# Patient Record
Sex: MALE | Race: BLACK OR AFRICAN AMERICAN
[De-identification: names, ages, dates, MRNs, and addresses within clinical notes are randomized per-mention and may not be internally consistent; named-entity substitution may affect disease eponyms.]

---

## 2017-04-02 ENCOUNTER — HOSPITAL ENCOUNTER (EMERGENCY)
Dept: HOSPITAL 61 - ER | Age: 52
Discharge: HOME | End: 2017-04-02
Payer: COMMERCIAL

## 2017-04-02 VITALS — WEIGHT: 250 LBS | HEIGHT: 74 IN | BODY MASS INDEX: 32.08 KG/M2

## 2017-04-02 VITALS — DIASTOLIC BLOOD PRESSURE: 77 MMHG | SYSTOLIC BLOOD PRESSURE: 131 MMHG

## 2017-04-02 DIAGNOSIS — M75.42: Primary | ICD-10-CM

## 2017-04-02 DIAGNOSIS — I10: ICD-10-CM

## 2017-04-02 NOTE — PHYS DOC
Past Medical History


Past Medical History:  Hypertension


Past Surgical History:  No Surgical History


Alcohol Use:  Heavy


Drug Use:  None





Adult General


Chief Complaint


Chief Complaint:  UPPER EXTREMITY PAIN





HPI


HPI


51-year-old male who's having left shoulder discomfort and states this has been 

ongoing for the last 30 days. He denies any inciting injury but does state he 

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 He states he has decreased 

range of motion in the left shoulder but denies any traumatic injury. He also 

states he was sleeping irregularly on it tonight and developed some numbness 

and tingling down his left arm with difficulty to extend his wrist.





Review of Systems


Review of Systems





Constitutional: Denies fever or chills []


Eyes: Denies change in visual acuity, redness, or eye pain []


HENT: Denies nasal congestion or sore throat []


Respiratory: Denies cough or shortness of breath []


Cardiovascular: No additional information not addressed in HPI []


GI: Denies abdominal pain, nausea, vomiting, bloody stools or diarrhea []


: Denies dysuria or hematuria []


Musculoskeletal: Denies back pain, has joint pain []


Integument: Denies rash or skin lesions []


Neurologic: Denies headache, focal weakness or sensory changes []


Endocrine: Denies polyuria or polydipsia []





Current Medications


Current Medications





 Current Medications








 Medications


  (Trade)  Dose


 Ordered  Sig/David  Start Time


 Stop Time Status Last Admin


Dose Admin


 


 Acetaminophen/


 Hydrocodone Bitart


  (Lortab 5/325)  1 tab  1X  ONCE  4/2/17 05:30


 4/2/17 05:31 DC  


 











Allergies


Allergies





 Allergies








Coded Allergies Type Severity Reaction Last Updated Verified


 


  No Known Drug Allergies    12/27/13 No











Physical Exam


Physical Exam





Constitutional: Well developed, well nourished, no acute distress, non-toxic 

appearance. []


HENT: Normocephalic, atraumatic, bilateral external ears normal, oropharynx 

moist, no oral exudates, nose normal. []


Eyes: PERRLA, EOMI, conjunctiva normal, no discharge. [] 


Neck: Normal range of motion, no tenderness, supple, no stridor. [] 


Cardiovascular:Heart rate regular rhythm, no murmur []


Lungs & Thorax:  Bilateral breath sounds clear to auscultation []


Abdomen: Bowel sounds normal, soft, no tenderness, no masses, no pulsatile 

masses. [] 


Skin: Warm, dry, no erythema, no rash. [] 


Back: No tenderness, no CVA tenderness. [] 


Extremities: Tenderness to palpation of the left anterior shoulder with 

positive drop arm test, patient has difficulty extending his wrist, no cyanosis

, no clubbing, ROM decreased in the left shoulder, no edema. [] 


Neurologic: Alert and oriented X 3, normal motor function, normal sensory 

function, no focal deficits noted. []


Psychologic: Affect normal, judgement normal, mood normal. []





Current Patient Data


Vital Signs





 Vital Signs








  Date Time  Temp Pulse Resp B/P Pulse Ox O2 Delivery O2 Flow Rate FiO2


 


4/2/17 04:08 98.2 71 16 131/77 97 Room Air  





 98.2       











EKG


EKG


[]





Radiology/Procedures


Radiology/Procedures


[]





Course & Med Decision Making


Course & Med Decision Making


Pertinent Labs and Imaging studies reviewed. (See chart for details)





This 50 yo male is presenting with symptoms fairly classic for shoulder 

impingement syndrome vs rotator cuff injury. There is no traumatic injury and I 

see no obvious indication for x-ray imaging. I will place the patient in a 

sling for comfort and provide him a short course of pain control with strict 

instruction to remain nonweightbearing on the left side. Work note will be 

provided. I will provide him follow-up with orthopedic surgeon next 2-3 days 

for likely shoulder MRI to rule out rotator cuff injury. Patient is very 

agreeable with this plan and was discharged without incident.





Dragon Disclaimer


Dragon Disclaimer


This electronic medical record was generated, in whole or in part, using a 

voice recognition dictation system.





Departure


Departure


Impression:  


 Primary Impression:  


 Shoulder impingement syndrome


Disposition:  01 HOME, SELF-CARE


Admitting Physician:  Other


Condition:  STABLE


Referrals:  


TERE VANEGAS MD (PCP)


Patient Instructions:  Rotator Cuff Injury





Additional Instructions:


Please follow up with the orthopedic surgeon in the next 2-3 days regarding 

your recent shoulder injury and remain in your sling until that time. Avoid any 

overuse injury. Take anti-inflammatories for your pain. Take your pain 

medication only when you are having symptoms. Avoid sleeping on the left side.


Scripts


Hydrocodone/Apap 5-325 (Norco 5-325 Tablet)1 Each Tablet1 Tab PO PRN Q6HRS PRN 

PAIN #10 TAB


   Prov:GASPER ANN DO         4/2/17








GASPER ANN DO Apr 2, 2017 05:14

## 2019-09-26 ENCOUNTER — HOSPITAL ENCOUNTER (EMERGENCY)
Dept: HOSPITAL 61 - ER | Age: 54
Discharge: HOME | End: 2019-09-26
Payer: COMMERCIAL

## 2019-09-26 VITALS — HEIGHT: 72 IN | WEIGHT: 230 LBS | BODY MASS INDEX: 31.15 KG/M2

## 2019-09-26 VITALS — SYSTOLIC BLOOD PRESSURE: 172 MMHG | DIASTOLIC BLOOD PRESSURE: 94 MMHG

## 2019-09-26 DIAGNOSIS — M54.41: Primary | ICD-10-CM

## 2019-09-26 DIAGNOSIS — I10: ICD-10-CM

## 2019-09-26 PROCEDURE — 99283 EMERGENCY DEPT VISIT LOW MDM: CPT

## 2019-09-26 NOTE — PHYS DOC
Past Medical History


Past Medical History:  Hypertension, Sciatica


 (VON CROUCH)


Past Surgical History:  No Surgical History


 (VON CROUCH)


Alcohol Use:  Heavy


Drug Use:  None


 (VON CROUCH)





Adult General


Chief Complaint


Chief Complaint:  LOWER BACK PAIN OR INJURY





HPI


HPI





Patient is a 54  year old male with history of hypertension, back pain with 

sciatica, who presents to the ED today complaining of 10 out of 10 right low 

back pain radiating to the right lower extremity that began today, patient 

denies any known injury. Denies any numbness or tingling to bilateral lower 

extremities, denies any loss of bowel bladder function. He states he has tried 

over-the-counter remedies with no improvement.


 (VON CROUCH)





Review of Systems


Review of Systems





Constitutional: Denies fever or chills []


GI: Denies abdominal pain, nausea, vomiting, bloody stools or diarrhea []


: Denies dysuria or hematuria []


Musculoskeletal: Reports right low back pain radiating to the right lower 

extremity


Integument: Denies rash or skin lesions []


Neurologic: Denies headache, focal weakness or sensory changes []








All other systems were reviewed and found to be within normal limits, except as 

documented in this note.


 (VON CROUCH)





Allergies


Allergies





Allergies








Coded Allergies Type Severity Reaction Last Updated Verified


 


  No Known Drug Allergies    12/27/13 No





 (MILA CHERRY MD)





Physical Exam


Physical Exam





Constitutional: Well developed, well nourished, no acute distress, non-toxic 

appearance. []


Abdomen: Bowel sounds normal, soft, no tenderness, no masses, no pulsatile 

masses. [] 


Skin: Warm, dry, no erythema, no rash. [] 


Back: No tenderness, no CVA tenderness. [] 


Extremities: mild SI joint tenderness on the right with positive straight leg 

raises, no cyanosis, no clubbing, ROM intact, no edema. [] 


Neurologic: Alert and oriented X 3, normal motor function, normal sensory 

function, no focal deficits noted. []


Psychologic: Affect normal, judgement normal, mood normal. []


 (VON CROUCH)





Current Patient Data


Vital Signs





                                   Vital Signs








  Date Time  Temp Pulse Resp B/P (MAP) Pulse Ox O2 Delivery O2 Flow Rate FiO2


 


9/26/19 16:56 97.8 69 16 172/94 (120) 98 Room Air  





 97.8       





 (MILA CHERRY MD)





EKG


EKG


[]


 (VON CROUCH)





Radiology/Procedures


Radiology/Procedures


[]


 (VON CROUCH)





Course & Med Decision Making


Course & Med Decision Making


Pertinent Labs and Imaging studies reviewed. (See chart for details)





This is a 54-year-old male patient who presents to the ED today with 

exacerbation of sciatica. No cauda equina syndrome symptoms. Discharged to home,

 f/u with PCP or Dr. Shields in 1 week. 


 (VON CROUCH)


Course & Med Decision Making





Staff Physician Addendum:


I was working in the ER during the course of this patient's visit.  I was 

available for consultation as needed, but I was not directly involved in the 

care of this patient.    


 (MILA CHERRY MD)


Dragon Disclaimer


Dragon Disclaimer


This electronic medical record was generated, in whole or in part, using a voice

 recognition dictation system.


 (VON CROUCH)





Departure


Departure


Impression:  


   Primary Impression:  


   Sciatica of right side


Disposition:  01 HOME, SELF-CARE


Condition:  STABLE


Referrals:  


TERE VANEGAS MD (PCP)


follow up in one week





CHARBEL SHIELDS MD


follow up in one week


Patient Instructions:  Sciatica with [facility]





Additional Instructions:  


Tried to apply heat to the affected area on your low back. Try to do the 

exercises provided. Follow-up with the provided doctors.


Scripts


Naproxen (NAPROXEN) 375 Mg Tablet


1 TAB PO BID, #20 TAB 0 Refills


   Prov: VON CROUCH         9/26/19 


Cyclobenzaprine Hcl (CYCLOBENZAPRINE HCL) 10 Mg Tablet


1 TAB PO TID, #30 TAB


   Prov: VON CROUCH         9/26/19 


Methylprednisolone (MEDROL) 4 Mg Tab.ds.pk


1 PKG PO UD, #1 PKG


   Prov: VON CROUCH         9/26/19 


Hydrocodone/Apap 5-325 (NORCO 5-325 TABLET) 1 Each Tablet


1 TAB PO Q6HRS, #14 TAB


   Prov: VON CROUCH         9/26/19











VON CROUCH              Sep 26, 2019 17:07


MILA CHERRY MD            Sep 27, 2019 01:22

## 2020-12-30 ENCOUNTER — HOSPITAL ENCOUNTER (EMERGENCY)
Dept: HOSPITAL 61 - ER | Age: 55
Discharge: LEFT BEFORE DISCHARGE | End: 2020-12-30
Payer: COMMERCIAL

## 2020-12-30 VITALS — HEIGHT: 72 IN | BODY MASS INDEX: 35.24 KG/M2 | WEIGHT: 260.15 LBS

## 2020-12-30 VITALS — SYSTOLIC BLOOD PRESSURE: 148 MMHG | DIASTOLIC BLOOD PRESSURE: 99 MMHG

## 2020-12-30 DIAGNOSIS — R42: Primary | ICD-10-CM

## 2020-12-30 DIAGNOSIS — Z53.21: ICD-10-CM

## 2020-12-30 DIAGNOSIS — R11.0: ICD-10-CM
